# Patient Record
Sex: FEMALE | Race: ASIAN
[De-identification: names, ages, dates, MRNs, and addresses within clinical notes are randomized per-mention and may not be internally consistent; named-entity substitution may affect disease eponyms.]

---

## 2018-10-17 ENCOUNTER — HOSPITAL ENCOUNTER (EMERGENCY)
Dept: HOSPITAL 62 - ER | Age: 28
Discharge: HOME | End: 2018-10-17
Payer: COMMERCIAL

## 2018-10-17 VITALS — SYSTOLIC BLOOD PRESSURE: 129 MMHG | DIASTOLIC BLOOD PRESSURE: 88 MMHG

## 2018-10-17 DIAGNOSIS — F17.210: ICD-10-CM

## 2018-10-17 DIAGNOSIS — R59.1: Primary | ICD-10-CM

## 2018-10-17 DIAGNOSIS — I10: ICD-10-CM

## 2018-10-17 LAB
ADD MANUAL DIFF: NO
ALBUMIN SERPL-MCNC: 4.3 G/DL (ref 3.5–5)
ALP SERPL-CCNC: 83 U/L (ref 38–126)
ALT SERPL-CCNC: 117 U/L (ref 9–52)
ANION GAP SERPL CALC-SCNC: 11 MMOL/L (ref 5–19)
AST SERPL-CCNC: 64 U/L (ref 14–36)
BASOPHILS # BLD AUTO: 0 10^3/UL (ref 0–0.2)
BASOPHILS NFR BLD AUTO: 0.4 % (ref 0–2)
BILIRUB DIRECT SERPL-MCNC: 0.2 MG/DL (ref 0–0.4)
BILIRUB SERPL-MCNC: 0.4 MG/DL (ref 0.2–1.3)
BUN SERPL-MCNC: 15 MG/DL (ref 7–20)
CALCIUM: 9 MG/DL (ref 8.4–10.2)
CHLORIDE SERPL-SCNC: 106 MMOL/L (ref 98–107)
CO2 SERPL-SCNC: 26 MMOL/L (ref 22–30)
EOSINOPHIL # BLD AUTO: 0 10^3/UL (ref 0–0.6)
EOSINOPHIL NFR BLD AUTO: 0.1 % (ref 0–6)
ERYTHROCYTE [DISTWIDTH] IN BLOOD BY AUTOMATED COUNT: 12.3 % (ref 11.5–14)
GLUCOSE SERPL-MCNC: 75 MG/DL (ref 75–110)
HCT VFR BLD CALC: 36.9 % (ref 36–47)
HGB BLD-MCNC: 13 G/DL (ref 12–15.5)
LIPASE SERPL-CCNC: 142 U/L (ref 23–300)
LYMPHOCYTES # BLD AUTO: 1.3 10^3/UL (ref 0.5–4.7)
LYMPHOCYTES NFR BLD AUTO: 36.1 % (ref 13–45)
MCH RBC QN AUTO: 28.6 PG (ref 27–33.4)
MCHC RBC AUTO-ENTMCNC: 35.3 G/DL (ref 32–36)
MCV RBC AUTO: 81 FL (ref 80–97)
MONOCYTES # BLD AUTO: 0.5 10^3/UL (ref 0.1–1.4)
MONOCYTES NFR BLD AUTO: 13.7 % (ref 3–13)
NEUTROPHILS # BLD AUTO: 1.7 10^3/UL (ref 1.7–8.2)
NEUTS SEG NFR BLD AUTO: 49.7 % (ref 42–78)
PLATELET # BLD: 242 10^3/UL (ref 150–450)
POTASSIUM SERPL-SCNC: 3.8 MMOL/L (ref 3.6–5)
PROT SERPL-MCNC: 8 G/DL (ref 6.3–8.2)
RBC # BLD AUTO: 4.55 10^6/UL (ref 3.72–5.28)
SODIUM SERPL-SCNC: 142.5 MMOL/L (ref 137–145)
TOTAL CELLS COUNTED % (AUTO): 100 %
WBC # BLD AUTO: 3.5 10^3/UL (ref 4–10.5)

## 2018-10-17 PROCEDURE — 86308 HETEROPHILE ANTIBODY SCREEN: CPT

## 2018-10-17 PROCEDURE — 83690 ASSAY OF LIPASE: CPT

## 2018-10-17 PROCEDURE — 76536 US EXAM OF HEAD AND NECK: CPT

## 2018-10-17 PROCEDURE — 36415 COLL VENOUS BLD VENIPUNCTURE: CPT

## 2018-10-17 PROCEDURE — 80053 COMPREHEN METABOLIC PANEL: CPT

## 2018-10-17 PROCEDURE — S0119 ONDANSETRON 4 MG: HCPCS

## 2018-10-17 PROCEDURE — 99284 EMERGENCY DEPT VISIT MOD MDM: CPT

## 2018-10-17 PROCEDURE — 85025 COMPLETE CBC W/AUTO DIFF WBC: CPT

## 2018-10-17 PROCEDURE — 84703 CHORIONIC GONADOTROPIN ASSAY: CPT

## 2018-10-17 NOTE — ER DOCUMENT REPORT
ED Medical Screen (RME)





- General


Chief Complaint: Abdominal Pain


Stated Complaint: FLU SYMPTOMS


Time Seen by Provider: 10/17/18 12:51


Mode of Arrival: Ambulatory


Information source: Patient


Notes: 





Patient presents complaining of tender swollen area to right side of her neck 

that is starting to worsen.  Patient denies any ear pain or dental pain.  

Patient states she was seen at the urgent care for this but states the symptoms 

have worsened.  Patient does complain of nausea and diarrhea x1 episode.  

Patient does complain of upper abdominal tenderness and headache.





I have greeted and performed a rapid initial assessment of this patient.  A 

comprehensive ED assessment and evaluation of the patient, analysis of test 

results and completion of the medical decision making process will be conducted 

by additional ED providers.


TRAVEL OUTSIDE OF THE [place] IN LAST 30 DAYS: No





- Related Data


Allergies/Adverse Reactions: 


 





No Known Allergies Allergy (Verified 10/17/18 11:57)


 











Past Medical History


Renal/ Medical History: Denies: Hx Peritoneal Dialysis





Physical Exam





- Vital signs


Vitals: 





 











Temp Pulse Resp BP Pulse Ox


 


 98.5 F   84   16   132/87 H  99 


 


 10/17/18 12:26  10/17/18 12:26  10/17/18 12:26  10/17/18 12:26  10/17/18 12:26














- HEENT


Neck: Lymphadenopathy - Swelling to right lateral side of neck mild erythema 

overlying area





Course





- Vital Signs


Vital signs: 





 











Temp Pulse Resp BP Pulse Ox


 


 98.5 F   84   16   132/87 H  99 


 


 10/17/18 12:26  10/17/18 12:26  10/17/18 12:26  10/17/18 12:26  10/17/18 12:26














Doctor's Discharge





- Discharge


Referrals: 


FLORA DICKENS DO [Primary Care Provider] - Follow up as needed

## 2018-10-17 NOTE — ER DOCUMENT REPORT
HPI





- HPI


Patient complains to provider of: right lymphadenopathy


Onset: Other


Onset/Duration: Worse - Saturday


Quality of pain: Achy


Severity: Moderate


Pain Level: 4


Context: 





28-year-old female presented to ED for complaint of lymphadenopathy to the 

right side of her neck posterior cervical chain since last week.  She still her 

primary care doct and a started her on Augmentin for a "sinus infection.  

Patient states she was not having sinus symptoms.  Patient states the 

antibiotics have not helped the swelling to her lymph nodes on the right 

lateral cervical chain.  Patient states she called her doctor today and they 

told her to come to the emergency room.


Associated Symptoms: Other - Right-sided posterior cervical chain 

lymphadenopathy


Exacerbated by: Denies


Relieved by: Denies


Similar symptoms previously: Yes


Recently seen / treated by doctor: Yes





- ROS


ROS below otherwise negative: Yes





- CONSTITUTIONAL


Constitutional: DENIES: Fever, Chills





- EENT


Notes: 





Right posterior cervical chain lymphadenopathy





- NEURO


Neurology: REPORTS: Headache





- CARDIOVASCULAR


Cardiovascular: DENIES: Chest pain





- RESPIRATORY


Respiratory: DENIES: Trouble Breathing, Coughing





- GASTROINTESTINAL


Gastrointestinal: REPORTS: Abdominal Pain, Nausea.  DENIES: Patient vomiting, 

Diarrhea, Constipation, Black / Bloody Stools





- URINARY


Urinary: DENIES: Dysuria, Urgency, Frequency





- REPRODUCTIVE


Reproductive: DENIES: Pregnant:, Postmenopausal, Abnormal bleeding / discharge





- MUSCULOSKELETAL


Musculoskeletal: DENIES: Extremity pain, Back Pain, Neck Pain, Swelling





- DERM


Skin Color: Normal


Skin Problems: None





Past Medical History





- General


Information source: Patient





- Social History


Smoking Status: Current Some Day Smoker


Cigarette use (# per day): Yes - Cigarette a week


Chew tobacco use (# tins/day): No


Smoking Education Provided: Yes - 4 minutes


Frequency of alcohol use: Occasional


Drug Abuse: None


Occupation: 


Lives with: Family


Family History: Reviewed & Not Pertinent


Patient has suicidal ideation: No


Patient has homicidal ideation: No





- Past Medical History


Cardiac Medical History: Reports: Hx Hypertension


Pulmonary Medical History: Reports: None


EENT Medical History: Reports: None


Neurological Medical History: Reports: None


Endocrine Medical History: Reports: None


Renal/ Medical History: Reports: None


Malignancy Medical History: Reports: None


GI Medical History: Reports: None


Musculoskeletal Medical History: Reports None


Skin Medical History: Reports None


Psychiatric Medical History: Reports: None


Traumatic Medical History: Reports: None


Infectious Medical History: Reports: None


Past Surgical History: Reports: Hx  Section





- Immunizations


Immunizations up to date: Yes


Hx Diphtheria, Pertussis, Tetanus Vaccination: Yes





Vertical Provider Document





- CONSTITUTIONAL


Agree With Documented VS: Yes


Exam Limitations: No Limitations


General Appearance: WD/WN, No Apparent Distress





- INFECTION CONTROL


TRAVEL OUTSIDE OF THE [place] IN LAST 30 DAYS: No





- HEENT


HEENT: Atraumatic, Normocephalic, PERRLA.  negative: Normal ENT Exam


Notes: 





Swollen red nasal turbinates.  Right posterior cervical chain lymphadenopathy





- NECK


Neck: Lymphadenopathy-Right - Posterior cervical chain





- RESPIRATORY


Respiratory: Breath Sounds Normal, No Respiratory Distress





- CARDIOVASCULAR


Cardiovascular: Regular Rate, Regular Rhythm, No Murmur





- GI/ABDOMEN


Gastrointestinal: Abdomen Soft, No Organomegaly, Normal Bowel Sounds





- BACK


Back: Normal Inspection





- MUSCULOSKELETAL/EXTREMETIES


Musculoskeletal/Extremeties: MAEW, FROM, Non-Tender





- NEURO


Level of Consciousness: Awake, Alert, Appropriate





- DERM


Integumentary: Warm, Dry, No Rash





Course





- Vital Signs


Vital signs: 


 











Temp Pulse Resp BP Pulse Ox


 


 98.5 F   84   16   132/87 H  99 


 


 10/17/18 12:26  10/17/18 12:26  10/17/18 12:26  10/17/18 12:26  10/17/18 12:26














- Laboratory


Result Diagrams: 


 10/17/18 13:30





 10/17/18 13:30


Laboratory results interpreted by me: 


 











  10/17/18 10/17/18





  13:30 13:30


 


WBC  3.5 L 


 


Monocytes %  13.7 H 


 


AST   64 H


 


ALT   117 H














- Diagnostic Test


Radiology reviewed: Image reviewed, Reports reviewed





Discharge





- Discharge


Clinical Impression: 


 Lymphadenopathy of right cervical region





Condition: Stable


Disposition: HOME, SELF-CARE


Instructions:  Family Physicians / Practices


Additional Instructions: 


Lymphadenopathy





     You have enlargement of lymph glands, called lymphadenopathy. Lymph glands 

filter tissue fluids. They help to fight infection. Most of the time, enlarged 

lymph glands are not serious.


     Lymph glands may react to a viral or bacterial infection by becoming 

swollen and painful. When the infection goes away, the glands shrink. Sometimes 

a lymph gland will remain enlarged for a long time after an infection. 

Occasionally, a lymph gland may be overwhelmed by infection and form an abscess.


     If an enlarged lymph gland has signs that are suspicious for tumor, the 

doctor will recommend a biopsy. A suspicious gland usually is NOT painful, 

grows very slowly, and is rock-hard to touch.


     See the doctor or return if there is increasing swelling and redness, high 

fever, difficulty breathing, or any other change for the worse.





Continue your antibiotics as prescribed.  You will need to follow-up with her 

primary doctor to have this monitored to ensure that it does reduce itself.  If 

this is not started down within the next 7 days you will need to have a 

surgical consult for a biopsy of the lymph node  Your ultrasound shows 

enlargement of the lymph nodes in the area in question.





Monospot was negative.








Acetaminophen





     Acetaminophen may be taken for pain relief or fever control. It's much 

safer than aspirin, offering a wider range of "safe" dosages.  It is safe 

during pregnancy.  Some brand names are Tylenol, Panadol, Datril, Anacin 3, 

Tempra, and Liquiprin. Acetaminophen can be repeated every four hours.  The 

following are maximum recommended dosages:





WEIGHT         Dose             Drops                  Elixir        Chewable(

80mg)


(LBS.)                            drprs=droppers    tsp=teaspoon


6                 40 mg            .4 ml (1/2)


6-11            80 mg            .8 ml (full)            1/2   tsp           1 

      tab


12-16         120 mg           1 1/2 drprs            3/4   tsp           1 1/2

  tabs


17-23         160 mg             2  drprs              1      tsp           2  

     tabs


24-30         240 mg             3  drprs              1 1/2 tsp           3   

    tabs


30-35         320 mg                                     2       tsp           

4       tabs


36-41         360 mg                                     2 1/4 tsp           4 1

/2  tabs


42-47         400 mg                                     2 1/2 tsp           5 

      tabs


48-53         480 mg                                     3       tsp          6

       tabs


54-59         520 mg                                     3  1/4 tsp          6 1

/2 tabs


60-64         560 mg                                     3  1/2 tsp          7 

     tabs 


65-70         600 mg                                     3  3/4 tsp          7 1

/2 tabs


71-76         640 mg                                     4       tsp           

8      tabs


77-82         720 mg                                     4 1/2  tsp           9

      tabs


83-88         800 mg                                     5       tsp           

10     tabs





>89 pounds or adults          650 mg to 900 mg 





Acetaminophen can be repeated every four hours. Maximum daily dose not to 

exceed 4000 mg.





   These maximum recommended dosages are slightly higher than the dosages 

written on the product container, but these dosages are very safe and well 

below the toxic dosage for acetaminophen.





Ibuprofen





     Ibuprofen is an excellent, safe drug for pain control.  In addition, it 

has potent antiinflammatory effects which are beneficial, especially in the 

treatment of injuries, arthritis, or tendonitis. It's best to take ibuprofen 

with food.  Persons with ulcer disease or allergy to aspirin should notify 

their physician of this before taking ibuprofen.


     Take the medication exactly as prescribed.  Don't take additional doses 

unless instructed to do so by your doctor.  If you develop wheezing, shortness 

of breath, hives, faintness, stomach pain, vomiting, or dark black stools, 

return for re-evaluation at once.








FOLLOW-UP CARE:


If you have been referred to a physician for follow-up care, call the physician

s office for an appointment as you were instructed or within the next two days.

  If you experience worsening or a significant change in your symptoms, notify 

the physician immediately or return to the Emergency Department at any time for 

re-evaluation.


Forms:  Elevated Blood Pressure, Smoking Cessation Education, Return to Work


Referrals: 


FLORA DICKENS DO [NO LOCAL MD] - Follow up as needed


[facility] [Provider Group] - Follow up as needed

## 2018-10-17 NOTE — RADIOLOGY REPORT (SQ)
EXAM DESCRIPTION:  U/S THYROID/SFT TISS HD   NECK



COMPLETED DATE/TIME:  10/17/2018 2:33 pm



REASON FOR STUDY:  pain/swelling r side neck



COMPARISON:  None.



TECHNIQUE:  Dynamic and static gray-scale images acquired of the thyroid gland. Selected additional c
olor/power Doppler images recorded.  All images stored to PACS.



LIMITATIONS:  None.



FINDINGS:  Sonographic imaging of the area of concern in the right posterior neck region shows a clus
ter hypoechoic densities in the area of concern.  The largest measures 1.6 cm in long axis.  There is
 some adjacent blood flow, but there is no Doppler flow in these hypoechoic areas.



IMPRESSION:  There may be a cluster of lymph nodes in the area of concern.  Vascular malformation is 
less likely.  Consider CT with contrast.



TECHNICAL DOCUMENTATION:  JOB ID:  [id number]

 2011 [employer]- All Rights Reserved



Reading location - IP/workstation name: [email or web address]

## 2018-10-24 ENCOUNTER — HOSPITAL ENCOUNTER (OUTPATIENT)
Dept: HOSPITAL 62 - OD | Age: 28
End: 2018-10-24
Attending: SURGERY
Payer: COMMERCIAL

## 2018-10-24 DIAGNOSIS — R59.0: Primary | ICD-10-CM

## 2018-10-24 PROCEDURE — 86663 EPSTEIN-BARR ANTIBODY: CPT

## 2018-10-24 PROCEDURE — 86256 FLUORESCENT ANTIBODY TITER: CPT

## 2018-10-24 PROCEDURE — 87496 CYTOMEG DNA AMP PROBE: CPT

## 2018-10-24 PROCEDURE — 36415 COLL VENOUS BLD VENIPUNCTURE: CPT

## 2018-10-24 PROCEDURE — 86757 RICKETTSIA ANTIBODY: CPT

## 2018-10-24 PROCEDURE — 86664 EPSTEIN-BARR NUCLEAR ANTIGEN: CPT

## 2018-10-24 PROCEDURE — 86665 EPSTEIN-BARR CAPSID VCA: CPT

## 2018-10-27 LAB
EBV EA IGG SER-ACNC: <9 U/ML (ref 0–8.9)
EPSTEIN BARR NUCLEAR AG IGG AB: >600 U/ML (ref 0–17.9)
EPSTEIN BARR VCA IGG AB: 439 U/ML (ref 0–17.9)
EPSTEIN BARR VCA IGM AB: <36 U/ML (ref 0–35.9)
RICKETTSIA IGM SER-ACNC: 0.23 INDEX (ref 0–0.89)

## 2018-10-29 LAB — CMV DNA SERPL NAA+PROBE-LOG IU: (no result) {LOG_IU}/ML

## 2018-11-08 ENCOUNTER — HOSPITAL ENCOUNTER (EMERGENCY)
Dept: HOSPITAL 62 - ER | Age: 28
LOS: 1 days | Discharge: HOME | End: 2018-11-09
Payer: COMMERCIAL

## 2018-11-08 DIAGNOSIS — D72.819: ICD-10-CM

## 2018-11-08 DIAGNOSIS — R20.2: ICD-10-CM

## 2018-11-08 DIAGNOSIS — R74.8: ICD-10-CM

## 2018-11-08 DIAGNOSIS — M79.10: Primary | ICD-10-CM

## 2018-11-08 DIAGNOSIS — R10.84: ICD-10-CM

## 2018-11-08 LAB
%HYPO/RBC NFR BLD AUTO: (no result) %
ABSOLUTE LYMPHOCYTES# (MANUAL): 0.4 10^3/UL (ref 0.5–4.7)
ABSOLUTE MONOCYTES # (MANUAL): 0.2 10^3/UL (ref 0.1–1.4)
ABSOLUTE NEUTROPHILS# (MANUAL): 1 10^3/UL (ref 1.7–8.2)
ADD MANUAL DIFF: YES
ALBUMIN SERPL-MCNC: 3.7 G/DL (ref 3.5–5)
ALP SERPL-CCNC: 88 U/L (ref 38–126)
ALT SERPL-CCNC: 265 U/L (ref 9–52)
ANION GAP SERPL CALC-SCNC: 11 MMOL/L (ref 5–19)
APPEARANCE UR: CLEAR
APTT PPP: YELLOW S
AST SERPL-CCNC: 221 U/L (ref 14–36)
BASOPHILS NFR BLD MANUAL: 0 % (ref 0–2)
BILIRUB DIRECT SERPL-MCNC: 0.2 MG/DL (ref 0–0.4)
BILIRUB SERPL-MCNC: 0.4 MG/DL (ref 0.2–1.3)
BILIRUB UR QL STRIP: NEGATIVE
BUN SERPL-MCNC: 12 MG/DL (ref 7–20)
CALCIUM: 8.5 MG/DL (ref 8.4–10.2)
CHLORIDE SERPL-SCNC: 104 MMOL/L (ref 98–107)
CO2 SERPL-SCNC: 24 MMOL/L (ref 22–30)
EOSINOPHIL NFR BLD MANUAL: 0 % (ref 0–6)
ERYTHROCYTE [DISTWIDTH] IN BLOOD BY AUTOMATED COUNT: 12.5 % (ref 11.5–14)
GLUCOSE SERPL-MCNC: 117 MG/DL (ref 75–110)
GLUCOSE UR STRIP-MCNC: NEGATIVE MG/DL
HCT VFR BLD CALC: 32.5 % (ref 36–47)
HGB BLD-MCNC: 11.6 G/DL (ref 12–15.5)
KETONES UR STRIP-MCNC: NEGATIVE MG/DL
MCH RBC QN AUTO: 28.9 PG (ref 27–33.4)
MCHC RBC AUTO-ENTMCNC: 35.8 G/DL (ref 32–36)
MCV RBC AUTO: 81 FL (ref 80–97)
MONOCYTES % (MANUAL): 10 % (ref 3–13)
NITRITE UR QL STRIP: NEGATIVE
OVALOCYTES BLD QL SMEAR: SLIGHT
PH UR STRIP: 6 [PH] (ref 5–9)
PLATELET # BLD: 157 10^3/UL (ref 150–450)
PLATELET COMMENT: ADEQUATE
PLATELET LARGE: PRESENT
POIKILOCYTOSIS BLD QL SMEAR: SLIGHT
POTASSIUM SERPL-SCNC: 3.8 MMOL/L (ref 3.6–5)
PROT SERPL-MCNC: 7 G/DL (ref 6.3–8.2)
PROT UR STRIP-MCNC: NEGATIVE MG/DL
RBC # BLD AUTO: 4.03 10^6/UL (ref 3.72–5.28)
SEGMENTED NEUTROPHILS % (MAN): 64 % (ref 42–78)
SODIUM SERPL-SCNC: 139.2 MMOL/L (ref 137–145)
SP GR UR STRIP: 1.02
TOTAL CELLS COUNTED BLD: 50
UROBILINOGEN UR-MCNC: NEGATIVE MG/DL (ref ?–2)
VARIANT LYMPHS NFR BLD MANUAL: 26 % (ref 13–45)
WBC # BLD AUTO: 1.6 10^3/UL (ref 4–10.5)

## 2018-11-08 PROCEDURE — 81001 URINALYSIS AUTO W/SCOPE: CPT

## 2018-11-08 PROCEDURE — 96361 HYDRATE IV INFUSION ADD-ON: CPT

## 2018-11-08 PROCEDURE — 36415 COLL VENOUS BLD VENIPUNCTURE: CPT

## 2018-11-08 PROCEDURE — 86701 HIV-1ANTIBODY: CPT

## 2018-11-08 PROCEDURE — 80053 COMPREHEN METABOLIC PANEL: CPT

## 2018-11-08 PROCEDURE — 96375 TX/PRO/DX INJ NEW DRUG ADDON: CPT

## 2018-11-08 PROCEDURE — 96374 THER/PROPH/DIAG INJ IV PUSH: CPT

## 2018-11-08 PROCEDURE — 81025 URINE PREGNANCY TEST: CPT

## 2018-11-08 PROCEDURE — 76705 ECHO EXAM OF ABDOMEN: CPT

## 2018-11-08 PROCEDURE — 80074 ACUTE HEPATITIS PANEL: CPT

## 2018-11-08 PROCEDURE — 85025 COMPLETE CBC W/AUTO DIFF WBC: CPT

## 2018-11-08 PROCEDURE — 83690 ASSAY OF LIPASE: CPT

## 2018-11-08 PROCEDURE — 99284 EMERGENCY DEPT VISIT MOD MDM: CPT

## 2018-11-08 NOTE — ER DOCUMENT REPORT
ED General





- General


Chief Complaint: Leg Pain


Stated Complaint: LEG/BACK PAIN


Time Seen by Provider: 18 21:54


Notes: 


Patient is a 28-year-old female that comes to the emergency department for 

chief complaint of flank pain, abdominal pain, intermittent sensation of 

tingling in her arms and legs.  She states that for the past 2 days she felt 

unwell and she had multiple episodes of vomiting and she was running fevers.  

She states she is also constipated and had a harder bowel movement earlier.  

She denies any particular area of abdominal pain.  She denies upper back pain, 

numbness, dysuria, vaginal discharge or bleeding.  She is on oral contraceptive

, hypertension medication.  She has had a .  She denies smoking, 

alcohol, recreational drugs.  She denies recent travel, she denies any obvious 

sick contacts.


TRAVEL OUTSIDE OF THE [place] IN LAST 30 DAYS: No





- Related Data


Allergies/Adverse Reactions: 


 





No Known Allergies Allergy (Verified 10/17/18 11:57)


 











Past Medical History





- General


Information source: Patient





- Social History


Smoking Status: Never Smoker


Frequency of alcohol use: None


Drug Abuse: None


Lives with: Family


Family History: Reviewed & Not Pertinent





- Past Medical History


Cardiac Medical History: Reports: Hx Hypertension


Renal/ Medical History: Denies: Hx Peritoneal Dialysis


Past Surgical History: Reports: Hx  Section





- Immunizations


Immunizations up to date: Yes


Hx Diphtheria, Pertussis, Tetanus Vaccination: Yes





Review of Systems





- Review of Systems


Constitutional: See HPI


EENT: No symptoms reported


Cardiovascular: No symptoms reported


Respiratory: No symptoms reported


Gastrointestinal: See HPI


Genitourinary: See HPI


Female Genitourinary: No symptoms reported


Musculoskeletal: See HPI


Skin: No symptoms reported


Hematologic/Lymphatic: No symptoms reported


Neurological/Psychological: No symptoms reported





Physical Exam





- Vital signs


Vitals: 


 











Temp Pulse Resp BP Pulse Ox


 


 98.6 F   98   24 H  117/75   100 


 


 18 21:40  18 21:40  18 21:40  18 21:40  18 21:40














- Notes


Notes: 


GENERAL: Intermittently patient looks mildly uncomfortable and then this 

resolves and she is talkative, alert, and well-appearing.


HEAD: Normocephalic, atraumatic.


EYES: Pupils equal, round, and reactive to light. Extraocular movements intact.


ENT: Oral mucosa moist, tongue midline. Oropharynx unremarkable. Airway patent. 

Nares patent, no nasal septal hematoma, TM's intact.


NECK: Full range of motion. Supple. Trachea midline.


LUNGS: Clear to auscultation bilaterally, no wheezes, rales, or rhonchi. No 

respiratory distress.


HEART: Regular rate and rhythm. No murmur


ABDOMEN: Tender with wincing with palpation of the upper abdomen. Mild 

generalized abdominal pain in both lower quadrants, nonspecific, no guarding, 

no noted distention, no rebound tenderness.


GENITOURINARY: Deferred


EXTREMITIES: Moves all 4 extremities spontaneously. No edema, normal radial and 

dorsalis pedis pulses bilaterally. No cyanosis.


BACK: no cervical, thoracic, lumbar midline tenderness.  There is mild 

bilateral paraspinal tenderness over the thoracic and lumbar areas, worse on 

the right.  No saddle anesthesia, normal distal neurovascular exam. 


NEUROLOGICAL: Alert and oriented x3. Normal speech. [cranial nerves II through 

XII grossly intact]. 


PSYCH: Normal affect, normal mood.


SKIN: Warm, dry, normal turgor. No rashes or lesions noted.








Course





- Re-evaluation


Re-evalutation: 


Patient initially mildly uncomfortable, she was given some pain medication, 

after the symptoms resolved.  She does have some discomfort in the upper 

abdomen and very mild discomfort generally but no guarding, rigidity, or 

rebound tenderness.  





CBC shows leukopenia with white blood cell count of 1.6, previous was 3.5.  

Patient states she was aware of this and was followed with primary care over 

the past 2 weeks in regards to this.  Liver enzymes are trending upwards as 

well.  Bilirubin and lipase are normal.  Ultrasound of the upper abdomen 

without any acute findings.  Discussed with patient in detail.  Denies IV drug 

abuse or any exposures.  After discussion she is still very agreeable with 

having HIV and hepatitis testing.  Patient was discussed with Dr. Calvert.  At 

this time it is clear patient will need close follow-up, probably hematology 

referral, and has an underlying undiagnosed condition, however at this time 

there does not appear to be any emergent pathology.  Patient was provided with 

her lab and imaging reports, she states she has close follow-up and she will be 

seen either tomorrow or Monday.  Discussed return precautions in detail.  

Patient states understanding and agreement.





- Vital Signs


Vital signs: 


 











Temp Pulse Resp BP Pulse Ox


 


 98.3 F   83   18   118/69   100 


 


 11/09/18 01:59  18 01:59  18 01:59  18 01:59  18 01:59














- Laboratory


Result Diagrams: 


 18 22:30





 18 22:30


Laboratory results interpreted by me: 


 











  18





  22:30 22:30


 


WBC  1.6 L 


 


Hgb  11.6 L 


 


Hct  32.5 L 


 


Abs Neuts (Manual)  1.0 L 


 


Abs Lymphs (Manual)  0.4 L 


 


Glucose   117 H


 


AST   221 H


 


ALT   265 H














Discharge





- Discharge


Clinical Impression: 


 Flank pain, Body aches, Paresthesias, Elevated liver enzymes





Abdominal pain


Qualifiers:


 Abdominal location: generalized Qualified Code(s): R10.84 - Generalized 

abdominal pain





Leukopenia


Qualifiers:


 Leukopenia type: unspecified Qualified Code(s): D72.819 - Decreased white 

blood cell count, unspecified





Condition: Stable


Disposition: HOME, SELF-CARE


Instructions:  Oral Narcotic Medication ([facility])


Additional Instructions: 


Your evaluation in the emergency department does not indicate the obvious cause 

of your low white blood cells, elevated liver function tests, and symptoms.  

This will need to be followed.  I recommend close follow-up with primary care 

for additional evaluation, you may also need to be seen by the hematologist, 

see the referral listed below.  We have hepatitis profile pending, these 

results will be available over the next several days to around a week.  Take 

the medication provided as prescribed if needed (for pain or nausea).





Return for any concerning symptoms including severe pain, fever of 100.4 or 

greater, returned or uncontrolled vomiting, difficulty breathing, or any other 

concerning symptoms.


Prescriptions: 


Ketorolac Tromethamine [Toradol 10 mg Tablet] 10 mg PO Q8HP PRN #24 tablet


 PRN Reason: 


Promethazine HCl [Phenergan 25 mg Tablet] 25 mg PO Q6H PRN #15 tablet


 PRN Reason: 


Referrals: 


MADELAINE MORALES MD [ACTIVE STAFF] - Follow up as needed


MIGUEL ANGEL MARTÍNEZ FNP [NURSE PRACTITIONER] - Follow up tomorrow

## 2018-11-09 VITALS — DIASTOLIC BLOOD PRESSURE: 69 MMHG | SYSTOLIC BLOOD PRESSURE: 118 MMHG

## 2018-11-09 LAB — PATH REV BLD -IMP: (no result)

## 2018-11-10 LAB — HEPATITIS C VIRUS ANTIBODY: <0.1 S/CO RATIO (ref 0–0.9)
